# Patient Record
Sex: FEMALE | Race: BLACK OR AFRICAN AMERICAN | Employment: FULL TIME | URBAN - METROPOLITAN AREA
[De-identification: names, ages, dates, MRNs, and addresses within clinical notes are randomized per-mention and may not be internally consistent; named-entity substitution may affect disease eponyms.]

---

## 2022-08-24 NOTE — PROGRESS NOTES
Zoya El is a ,  46 y.o. female   No LMP recorded. (Menstrual status: Menopause). She presents for her annual checkup. She is having no significant problems. Menstrual status:  Her periods are absent patient is menopausal.       Contraception:  The current method of family planning is tubal ligation. Hormonal status:  She reports no perimenstrual type symptoms. She is not having vasomotor symptoms. The patient is not using any ERT. Sexual history:  She  reports being sexually active and has had partner(s) who are male. Medical conditions:  Since her last annual GYN exam about two years ago, she has not the following changes in her health history: none. Pap and Mammogram History:  Her most recent Pap smear was normal, obtained 2020. The patient had mammogram in our office today. Gyn Flowsheet:  No flowsheet data found. Breast Cancer History: cousin. Osteoporosis History:  Family history does not include a first or second degree relative with osteopenia or osteoporosis.   A bone density scan was obtained 2020 and revealed normal.      Medical History:  Patient Active Problem List   Diagnosis Code    Goiter E04.9    Obesity with body mass index 30 or greater E66.9     Past Medical History:   Diagnosis Date    Apprehension about Gyn Exams     High risk HPV (human papillomavirus) test positive Type 16 2014    Visit for review of DEXA scan 2020    T+2.2 spine t-0.1 hip    Vitamin D deficiency 2019    9.0     Past Surgical History:   Procedure Laterality Date    HX  SECTION  2002    HX  SECTION  2006    Window      OB History    Para Term  AB Living   2 2 2 0 0 2   SAB IAB Ectopic Molar Multiple Live Births   0 0 0 0 0 2      # Outcome Date GA Lbr Abisai/2nd Weight Sex Delivery Anes PTL Lv   2 Term 06 38w0d  6 lb 3 oz (2.807 kg) F CS-LTranv Spinal  [place]   1 Term 02 38w0d  8 lb 2 oz (3.685 kg) M CS-LTranv   [place]     Social History     Socioeconomic History    Marital status:    Tobacco Use    Smoking status: Never    Smokeless tobacco: Never   Vaping Use    Vaping Use: Never used   Substance and Sexual Activity    Alcohol use: Yes    Drug use: Never    Sexual activity: Yes     Partners: Male      Family History   Problem Relation Age of Onset    Hypertension Maternal Grandmother     High Cholesterol Maternal Grandmother     Thyroid Disease Maternal Grandmother     Stroke Maternal Grandmother     Hypertension Maternal Grandfather     High Cholesterol Maternal Grandfather     Stroke Maternal Grandfather     Hypertension Paternal Grandmother     High Cholesterol Paternal Grandmother     Stroke Paternal Grandmother     Hypertension Paternal Grandfather     High Cholesterol Paternal Grandfather     Stroke Paternal Grandfather     Breast Cancer Maternal Cousin      Current Outpatient Medications on File Prior to Visit   Medication Sig Dispense Refill    cholecalciferol, vitamin D3, (VITAMIN D3 PO) Take  by mouth. FOLIC ACID PO Take  by mouth. No current facility-administered medications on file prior to visit.      Allergies   Allergen Reactions    Codeine Rash    Penicillins Rash     Other reaction(s): Unknown       Review of Systems:  History obtained from the patient-negative for:  Constitutional: weight loss, fever, night sweats  HEENT: hearing loss, earache, congestion, snoring, sorethroat  CV: chest pain, palpitations, edema  Resp: cough, shortness of breath, wheezing  Breast: breast lumps, nipple discharge, galactorrhea  GI: change in bowel habits, abdominal pain, black or bloody stools  : frequency, dysuria, hematuria, vaginal discharge  MSK: back pain, joint pain, muscle pain  Skin: itching, rash, hives  Neuro: dizziness, headache, confusion, weakness  Psych: anxiety, depression, change in mood  Heme/lymph: bleeding, bruising, pallor    Physical Exam  Visit Vitals  /84   Ht 5' (1.524 m)   Wt 153 lb (69.4 kg)   BMI 29.88 kg/m²       Constitutional  Appearance: well-nourished, well developed, alert, in no acute distress    HENT  Head and Face: appears normal    Neck  Inspection/Palpation: normal appearance, no masses or tenderness  Lymph Nodes: no lymphadenopathy present  Thyroid: gland size normal, nontender, no nodules or masses present on palpation    Chest  Respiratory Effort: breathing unlabored  Auscultation: normal breath sounds    Cardiovascular  Heart:   Auscultation: regular rate and rhythm without murmur    Breasts  Inspection of Breasts: breasts symmetrical, no skin changes, no discharge present, nipple appearance normal, no skin retraction present  Palpation of Breasts and Axillae: no masses present on palpation, no breast tenderness  Axillary Lymph Nodes: no lymphadenopathy present    Gastrointestinal  Abdominal Examination: abdomen non-tender to palpation, normal bowel sounds, no masses present  Liver and spleen: no hepatomegaly present, spleen not palpable  Hernias: no hernias identified    Genitourinary  External Genitalia: normal appearance for age, no discharge present, no tenderness present, no inflammatory lesions present, no masses present, no atrophy present  Vagina: normal vaginal vault without central or paravaginal defects, no discharge present, no inflammatory lesions present, no masses present  Bladder: non-tender to palpation  Urethra: appears normal  Cervix: normal   Uterus: normal size, shape and consistency  Adnexa: no adnexal tenderness present, no adnexal masses present  Perineum: perineum within normal limits, no evidence of trauma, no rashes or skin lesions present  Anus: anus within normal limits, no hemorrhoids present  Inguinal Lymph Nodes: no lymphadenopathy present    Skin  General Inspection: no rash, no lesions identified    Neurologic/Psychiatric  Mental Status:  Orientation: grossly oriented to person, place and time  Mood and Affect: mood normal, affect appropriate    Labs:  No results found for this or any previous visit (from the past 12 hour(s)). Assessment:    ICD-10-CM ICD-9-CM    1. Routine gynecological examination  Z01.419 V72.31           Plan:  Counseled re: diet, exercise, healthy lifestyle  Rec annual mammogram  Screening Colonoscopy advised.   Name & Number on tearoff sheet given for Dr. Rigo Lloyd and/or Dr. Donald Scott    Return in about 1 year (around 8/25/2023) for Annual.

## 2022-08-25 ENCOUNTER — OFFICE VISIT (OUTPATIENT)
Dept: OBGYN CLINIC | Age: 52
End: 2022-08-25

## 2022-08-25 VITALS
WEIGHT: 153 LBS | DIASTOLIC BLOOD PRESSURE: 84 MMHG | HEIGHT: 60 IN | SYSTOLIC BLOOD PRESSURE: 134 MMHG | BODY MASS INDEX: 30.04 KG/M2

## 2022-08-25 DIAGNOSIS — Z01.419 ROUTINE GYNECOLOGICAL EXAMINATION: Primary | ICD-10-CM

## 2022-08-25 PROBLEM — E04.9 GOITER: Status: ACTIVE | Noted: 2022-08-25

## 2022-08-25 PROBLEM — E66.9 OBESITY WITH BODY MASS INDEX 30 OR GREATER: Status: ACTIVE | Noted: 2022-08-25

## 2022-08-25 PROCEDURE — 99386 PREV VISIT NEW AGE 40-64: CPT | Performed by: OBSTETRICS & GYNECOLOGY

## 2022-08-29 LAB
CYTOLOGIST CVX/VAG CYTO: NORMAL
CYTOLOGY CVX/VAG DOC CYTO: NORMAL
CYTOLOGY CVX/VAG DOC THIN PREP: NORMAL
CYTOLOGY HISTORY:: NORMAL
DX ICD CODE: NORMAL
HPV I/H RISK 4 DNA CVX QL PROBE+SIG AMP: NEGATIVE
Lab: NORMAL
OTHER STN SPEC: NORMAL
STAT OF ADQ CVX/VAG CYTO-IMP: NORMAL

## 2023-08-24 NOTE — PROGRESS NOTES
Gricel Hodges is a 48 y.o. female returns for an annual exam     Chief Complaint   Patient presents with    Annual Exam       No LMP recorded. Patient is postmenopausal.  Her periods are absent. Problems: no problems  Birth Control: tubal ligation? Last Pap: normal obtained 1 year(s) ago. She does not have a history of ALYSSA 2, 3 or cervical cancer. Last Mammogram: had her mammogram today in our office. Last Bone Density: T+2.2 spine t-0.1 hip, obtained in 2020. Last colonoscopy: normal obtained 2 month(s) ago, per pt. Repeat in 10 yrs. 1. Have you been to the ER, urgent care clinic, or hospitalized since your last visit? No    2. Have you seen or consulted any other health care providers outside of the [address] [place] [address] Avenue since your last visit? Yes, sees PCP.      Examination chaperoned by Tonja Augustin MA.

## 2023-08-29 ENCOUNTER — OFFICE VISIT (OUTPATIENT)
Age: 53
End: 2023-08-29

## 2023-08-29 VITALS — WEIGHT: 149 LBS | DIASTOLIC BLOOD PRESSURE: 80 MMHG | SYSTOLIC BLOOD PRESSURE: 121 MMHG | BODY MASS INDEX: 29.1 KG/M2

## 2023-08-29 DIAGNOSIS — Z12.4 CERVICAL CANCER SCREENING: ICD-10-CM

## 2023-08-29 DIAGNOSIS — Z01.419 ENCOUNTER FOR GYNECOLOGICAL EXAMINATION (GENERAL) (ROUTINE) WITHOUT ABNORMAL FINDINGS: Primary | ICD-10-CM

## 2023-08-29 PROCEDURE — 99396 PREV VISIT EST AGE 40-64: CPT | Performed by: OBSTETRICS & GYNECOLOGY

## 2023-08-29 NOTE — PROGRESS NOTES
ANNUAL EXAM AGES 40-64      History:  Shine Morgan is a 48y.o. year old  Black /  female   No LMP recorded. Patient is postmenopausal.    She presents for her annual checkup. No LMP recorded. Patient is postmenopausal.  No symptoms. No pMB  Her periods are absent. Problems: no problems  Birth Control: tubal ligation  Last Pap: normal obtained 1 year(s) ago. She does not have a history of ALYSSA 2, 3 or cervical cancer. Last Mammogram: had her mammogram today in our office. Was read as normal.  Last Bone Density: T+2.2 spine t-0.1 hip, obtained in . Last colonoscopy: normal obtained 2 month(s) ago, per pt. Repeat in 10 yrs.      Problem List:  Patient Active Problem List    Diagnosis Date Noted    Goiter 2022    Obesity with body mass index 30 or greater 2022     Past Medical History:   Diagnosis Date    Apprehension     Cervical high risk HPV (human papillomavirus) test positive 2014    Hypothyroidism     Menopausal symptoms     Visit for review of DEXA scan 2020    T+2.2 spine t-0.1 hip    Vitamin D deficiency 2019    9.0     Past Surgical History:   Procedure Laterality Date     SECTION  2002     SECTION  2006    Window     COLONOSCOPY  2023    WNL Repeat 10 yr       OB History    Para Term  AB Living   2 2 2 0 0 2   SAB IAB Ectopic Molar Multiple Live Births   0 0 0 0 0 2      # Outcome Date GA Lbr Omari/2nd Weight Sex Delivery Anes PTL Lv   2 Term 06 38w0d  6 lb 3 oz (2.807 kg) F CS-LTranv Spinal  [place]   1 Term 02 38w0d  8 lb 2 oz (3.685 kg) M CS-LTranv   [place]     Social History     Socioeconomic History    Marital status:      Spouse name: None    Number of children: 2    Years of education: None    Highest education level: None   Tobacco Use    Smoking status: Never    Smokeless tobacco: Never   Substance and Sexual Activity    Alcohol use: Yes     Comment: 1-2 Times a month

## 2023-09-04 LAB
CYTOLOGIST CVX/VAG CYTO: NORMAL
CYTOLOGY CVX/VAG DOC CYTO: NORMAL
CYTOLOGY CVX/VAG DOC THIN PREP: NORMAL
DX ICD CODE: NORMAL
HPV GENOTYPE REFLEX: NORMAL
HPV I/H RISK 4 DNA CVX QL PROBE+SIG AMP: NEGATIVE
Lab: NORMAL
OTHER STN SPEC: NORMAL
STAT OF ADQ CVX/VAG CYTO-IMP: NORMAL

## 2024-10-14 ENCOUNTER — OFFICE VISIT (OUTPATIENT)
Age: 54
End: 2024-10-14
Payer: COMMERCIAL

## 2024-10-14 VITALS
HEIGHT: 60 IN | BODY MASS INDEX: 28.23 KG/M2 | WEIGHT: 143.8 LBS | RESPIRATION RATE: 18 BRPM | SYSTOLIC BLOOD PRESSURE: 148 MMHG | HEART RATE: 102 BPM | DIASTOLIC BLOOD PRESSURE: 77 MMHG

## 2024-10-14 DIAGNOSIS — Z01.419 WELL WOMAN EXAM WITH ROUTINE GYNECOLOGICAL EXAM: Primary | ICD-10-CM

## 2024-10-14 PROCEDURE — 99396 PREV VISIT EST AGE 40-64: CPT | Performed by: OBSTETRICS & GYNECOLOGY
